# Patient Record
Sex: FEMALE | Race: WHITE | Employment: UNEMPLOYED | URBAN - METROPOLITAN AREA
[De-identification: names, ages, dates, MRNs, and addresses within clinical notes are randomized per-mention and may not be internally consistent; named-entity substitution may affect disease eponyms.]

---

## 2017-07-13 ENCOUNTER — OFFICE VISIT (OUTPATIENT)
Dept: SURGERY | Facility: CLINIC | Age: 27
End: 2017-07-13

## 2017-07-13 VITALS
WEIGHT: 142.81 LBS | DIASTOLIC BLOOD PRESSURE: 84 MMHG | RESPIRATION RATE: 16 BRPM | BODY MASS INDEX: 25.3 KG/M2 | TEMPERATURE: 98 F | HEART RATE: 74 BPM | HEIGHT: 63 IN | SYSTOLIC BLOOD PRESSURE: 118 MMHG

## 2017-07-13 DIAGNOSIS — K81.0 ACUTE CHOLECYSTITIS: Primary | ICD-10-CM

## 2017-07-13 DIAGNOSIS — K80.43 CHOLEDOCHOLITHIASIS WITH ACUTE CHOLECYSTITIS WITH OBSTRUCTION: ICD-10-CM

## 2017-07-13 DIAGNOSIS — Z98.890 STATUS POST ENDOSCOPIC RETROGRADE CHOLANGIOPANCREATOGRAPHY: ICD-10-CM

## 2017-07-13 DIAGNOSIS — K80.67 CALCULUS OF GALLBLADDER AND BILE DUCT WITH ACUTE ON CHRONIC CHOLECYSTITIS, WITH OBSTRUCTION: ICD-10-CM

## 2017-07-13 PROCEDURE — 99244 OFF/OP CNSLTJ NEW/EST MOD 40: CPT | Performed by: SURGERY

## 2017-07-14 ENCOUNTER — TELEPHONE (OUTPATIENT)
Dept: SURGERY | Facility: CLINIC | Age: 27
End: 2017-07-14

## 2017-07-14 DIAGNOSIS — K80.18 CALCULUS OF GALLBLADDER WITH OTHER CHOLECYSTITIS WITHOUT OBSTRUCTION: Primary | ICD-10-CM

## 2017-07-17 PROBLEM — K80.20 CHOLELITHIASIS: Status: ACTIVE | Noted: 2017-07-17

## 2017-07-17 PROBLEM — Z98.890 STATUS POST ENDOSCOPIC RETROGRADE CHOLANGIOPANCREATOGRAPHY: Status: ACTIVE | Noted: 2017-07-17

## 2017-07-17 PROBLEM — K81.0 ACUTE CHOLECYSTITIS: Status: ACTIVE | Noted: 2017-07-17

## 2017-07-17 PROBLEM — K80.43 CHOLEDOCHOLITHIASIS WITH ACUTE CHOLECYSTITIS WITH OBSTRUCTION: Status: ACTIVE | Noted: 2017-07-17

## 2017-07-17 NOTE — OR NURSING
Faxed request to [place] [address] record department for test results done while patient was hospitalized on July 2 thru 4th

## 2017-07-17 NOTE — H&P
New Patient  Jessika Lopez  1/31/1990 7/17/2017    This patient was referred by Dr Lamonte Pollard for evaluation and consultation for symptomatic cholelithiasis. Chief Complaint: cholelithiasis    History of Present Illness:  The patient is a 25-year-old fem N/A    Years of education: N/A  Number of children: N/A     Occupational History  None on file     Social History Main Topics   Smoking status: Not on file    Smokeless tobacco: Not on file    Alcohol use Not on file    Drug use: Unknown     Other Topics C The patient is a 64-year-old female who recently underwent admission at [facility] [address] [facility] [place] for cholelithiasis.   The patient states that she was admitted on July 6 through [address] emergency department for cholelithiasis and choled recuperation and postoperative course was also reviewed. All questions from the patient were answered in detail. The patient did verbalize understanding and does not have any further questions at this time.   The patient does wish to proceed with the prop

## 2017-07-28 ENCOUNTER — SURGERY (OUTPATIENT)
Age: 27
End: 2017-07-28

## 2017-07-28 ENCOUNTER — ANESTHESIA (OUTPATIENT)
Dept: SURGERY | Facility: HOSPITAL | Age: 27
DRG: 418 | End: 2017-07-28
Payer: COMMERCIAL

## 2017-07-28 ENCOUNTER — ANESTHESIA EVENT (OUTPATIENT)
Dept: SURGERY | Facility: HOSPITAL | Age: 27
DRG: 418 | End: 2017-07-28
Payer: COMMERCIAL

## 2017-07-28 ENCOUNTER — HOSPITAL ENCOUNTER (INPATIENT)
Facility: HOSPITAL | Age: 27
LOS: 2 days | Discharge: HOME | DRG: 418 | End: 2017-07-30
Attending: SURGERY | Admitting: SURGERY
Payer: COMMERCIAL

## 2017-07-28 ENCOUNTER — APPOINTMENT (OUTPATIENT)
Dept: GENERAL RADIOLOGY | Facility: HOSPITAL | Age: 27
DRG: 418 | End: 2017-07-28
Attending: SURGERY
Payer: COMMERCIAL

## 2017-07-28 DIAGNOSIS — K80.18 CALCULUS OF GALLBLADDER WITH OTHER CHOLECYSTITIS WITHOUT OBSTRUCTION: ICD-10-CM

## 2017-07-28 LAB
ALBUMIN SERPL-MCNC: 3.5 G/DL (ref 3.5–4.8)
ALP LIVER SERPL-CCNC: 54 U/L (ref 37–98)
ALT SERPL-CCNC: 20 U/L (ref 14–54)
AST SERPL-CCNC: 17 U/L (ref 15–41)
BASOPHILS # BLD AUTO: 0.03 X10(3) UL (ref 0–0.1)
BASOPHILS NFR BLD AUTO: 0.2 %
BILIRUB SERPL-MCNC: 0.3 MG/DL (ref 0.1–2)
BUN BLD-MCNC: 10 MG/DL (ref 8–20)
CALCIUM BLD-MCNC: 8.1 MG/DL (ref 8.3–10.3)
CHLORIDE: 108 MMOL/L (ref 101–111)
CO2: 24 MMOL/L (ref 22–32)
CREAT BLD-MCNC: 0.67 MG/DL (ref 0.55–1.02)
EOSINOPHIL # BLD AUTO: 0 X10(3) UL (ref 0–0.3)
EOSINOPHIL NFR BLD AUTO: 0 %
ERYTHROCYTE [DISTWIDTH] IN BLOOD BY AUTOMATED COUNT: 14.1 % (ref 11.5–16)
GLUCOSE BLD-MCNC: 118 MG/DL (ref 70–99)
HCT VFR BLD AUTO: 37.3 % (ref 34–50)
HGB BLD-MCNC: 12.4 G/DL (ref 12–16)
IMMATURE GRANULOCYTE COUNT: 0.08 X10(3) UL (ref 0–1)
IMMATURE GRANULOCYTE RATIO %: 0.5 %
LYMPHOCYTES # BLD AUTO: 0.52 X10(3) UL (ref 0.9–4)
LYMPHOCYTES NFR BLD AUTO: 3.5 %
M PROTEIN MFR SERPL ELPH: 6.9 G/DL (ref 6.1–8.3)
MCH RBC QN AUTO: 31.1 PG (ref 27–33.2)
MCHC RBC AUTO-ENTMCNC: 33.2 G/DL (ref 31–37)
MCV RBC AUTO: 93.5 FL (ref 81–100)
MONOCYTES # BLD AUTO: 0.21 X10(3) UL (ref 0.1–0.6)
MONOCYTES NFR BLD AUTO: 1.4 %
NEUTROPHIL ABS PRELIM: 13.9 X10 (3) UL (ref 1.3–6.7)
NEUTROPHILS # BLD AUTO: 13.9 X10(3) UL (ref 1.3–6.7)
NEUTROPHILS NFR BLD AUTO: 94.4 %
PLATELET # BLD AUTO: 245 10(3)UL (ref 150–450)
POCT LOT NUMBER: NORMAL
POCT URINE PREGNANCY: NEGATIVE
POTASSIUM SERPL-SCNC: 3.9 MMOL/L (ref 3.6–5.1)
RBC # BLD AUTO: 3.99 X10(6)UL (ref 3.8–5.1)
RED CELL DISTRIBUTION WIDTH-SD: 48.6 FL (ref 35.1–46.3)
SODIUM SERPL-SCNC: 141 MMOL/L (ref 136–144)
WBC # BLD AUTO: 14.7 X10(3) UL (ref 4–13)

## 2017-07-28 PROCEDURE — 0FT44ZZ RESECTION OF GALLBLADDER, PERCUTANEOUS ENDOSCOPIC APPROACH: ICD-10-PCS | Performed by: SURGERY

## 2017-07-28 PROCEDURE — 80053 COMPREHEN METABOLIC PANEL: CPT | Performed by: INTERNAL MEDICINE

## 2017-07-28 PROCEDURE — 76000 FLUOROSCOPY <1 HR PHYS/QHP: CPT | Performed by: SURGERY

## 2017-07-28 PROCEDURE — BF10YZZ FLUOROSCOPY OF BILE DUCTS USING OTHER CONTRAST: ICD-10-PCS | Performed by: SURGERY

## 2017-07-28 PROCEDURE — 85025 COMPLETE CBC W/AUTO DIFF WBC: CPT | Performed by: INTERNAL MEDICINE

## 2017-07-28 PROCEDURE — 81025 URINE PREGNANCY TEST: CPT | Performed by: SURGERY

## 2017-07-28 PROCEDURE — 88304 TISSUE EXAM BY PATHOLOGIST: CPT | Performed by: SURGERY

## 2017-07-28 RX ORDER — NALOXONE HYDROCHLORIDE 0.4 MG/ML
80 INJECTION, SOLUTION INTRAMUSCULAR; INTRAVENOUS; SUBCUTANEOUS AS NEEDED
Status: DISCONTINUED | OUTPATIENT
Start: 2017-07-28 | End: 2017-07-28 | Stop reason: HOSPADM

## 2017-07-28 RX ORDER — ONDANSETRON 2 MG/ML
4 INJECTION INTRAMUSCULAR; INTRAVENOUS AS NEEDED
Status: DISCONTINUED | OUTPATIENT
Start: 2017-07-28 | End: 2017-07-28 | Stop reason: HOSPADM

## 2017-07-28 RX ORDER — DEXAMETHASONE SODIUM PHOSPHATE 4 MG/ML
4 VIAL (ML) INJECTION AS NEEDED
Status: DISCONTINUED | OUTPATIENT
Start: 2017-07-28 | End: 2017-07-28 | Stop reason: HOSPADM

## 2017-07-28 RX ORDER — DEXTROSE, SODIUM CHLORIDE, AND POTASSIUM CHLORIDE 5; .45; .15 G/100ML; G/100ML; G/100ML
INJECTION INTRAVENOUS
Status: COMPLETED
Start: 2017-07-28 | End: 2017-07-28

## 2017-07-28 RX ORDER — HYDROMORPHONE HYDROCHLORIDE 1 MG/ML
INJECTION, SOLUTION INTRAMUSCULAR; INTRAVENOUS; SUBCUTANEOUS
Status: COMPLETED
Start: 2017-07-28 | End: 2017-07-28

## 2017-07-28 RX ORDER — DEXTROSE, SODIUM CHLORIDE, AND POTASSIUM CHLORIDE 5; .45; .15 G/100ML; G/100ML; G/100ML
INJECTION INTRAVENOUS CONTINUOUS
Status: DISCONTINUED | OUTPATIENT
Start: 2017-07-28 | End: 2017-07-30

## 2017-07-28 RX ORDER — HYDROCODONE BITARTRATE AND ACETAMINOPHEN 5; 325 MG/1; MG/1
2 TABLET ORAL AS NEEDED
Status: DISCONTINUED | OUTPATIENT
Start: 2017-07-28 | End: 2017-07-28 | Stop reason: HOSPADM

## 2017-07-28 RX ORDER — FAMOTIDINE 10 MG/ML
20 INJECTION, SOLUTION INTRAVENOUS 2 TIMES DAILY
Status: DISCONTINUED | OUTPATIENT
Start: 2017-07-28 | End: 2017-07-30

## 2017-07-28 RX ORDER — HEPARIN SODIUM 5000 [USP'U]/ML
5000 INJECTION, SOLUTION INTRAVENOUS; SUBCUTANEOUS EVERY 8 HOURS
Status: DISCONTINUED | OUTPATIENT
Start: 2017-07-28 | End: 2017-07-30

## 2017-07-28 RX ORDER — HYDROMORPHONE HYDROCHLORIDE 1 MG/ML
0.4 INJECTION, SOLUTION INTRAMUSCULAR; INTRAVENOUS; SUBCUTANEOUS EVERY 5 MIN PRN
Status: DISCONTINUED | OUTPATIENT
Start: 2017-07-28 | End: 2017-07-28 | Stop reason: HOSPADM

## 2017-07-28 RX ORDER — FAMOTIDINE 20 MG/1
20 TABLET ORAL 2 TIMES DAILY
Status: DISCONTINUED | OUTPATIENT
Start: 2017-07-28 | End: 2017-07-30

## 2017-07-28 RX ORDER — HEPARIN SODIUM 5000 [USP'U]/ML
INJECTION, SOLUTION INTRAVENOUS; SUBCUTANEOUS
Status: COMPLETED
Start: 2017-07-28 | End: 2017-07-28

## 2017-07-28 RX ORDER — HYDROCODONE BITARTRATE AND ACETAMINOPHEN 5; 325 MG/1; MG/1
1 TABLET ORAL AS NEEDED
Status: DISCONTINUED | OUTPATIENT
Start: 2017-07-28 | End: 2017-07-28 | Stop reason: HOSPADM

## 2017-07-28 RX ORDER — SODIUM CHLORIDE, SODIUM LACTATE, POTASSIUM CHLORIDE, CALCIUM CHLORIDE 600; 310; 30; 20 MG/100ML; MG/100ML; MG/100ML; MG/100ML
INJECTION, SOLUTION INTRAVENOUS CONTINUOUS
Status: DISCONTINUED | OUTPATIENT
Start: 2017-07-28 | End: 2017-07-30

## 2017-07-28 RX ORDER — HYDROMORPHONE HYDROCHLORIDE 1 MG/ML
0.5 INJECTION, SOLUTION INTRAMUSCULAR; INTRAVENOUS; SUBCUTANEOUS EVERY 30 MIN PRN
Status: DISCONTINUED | OUTPATIENT
Start: 2017-07-28 | End: 2017-07-30

## 2017-07-28 RX ORDER — METOCLOPRAMIDE HYDROCHLORIDE 5 MG/ML
10 INJECTION INTRAMUSCULAR; INTRAVENOUS AS NEEDED
Status: DISCONTINUED | OUTPATIENT
Start: 2017-07-28 | End: 2017-07-28 | Stop reason: HOSPADM

## 2017-07-28 RX ORDER — HEPARIN SODIUM 5000 [USP'U]/ML
5000 INJECTION, SOLUTION INTRAVENOUS; SUBCUTANEOUS ONCE
Status: COMPLETED | OUTPATIENT
Start: 2017-07-28 | End: 2017-07-28

## 2017-07-28 RX ORDER — SODIUM CHLORIDE, SODIUM LACTATE, POTASSIUM CHLORIDE, CALCIUM CHLORIDE 600; 310; 30; 20 MG/100ML; MG/100ML; MG/100ML; MG/100ML
INJECTION, SOLUTION INTRAVENOUS CONTINUOUS
Status: DISCONTINUED | OUTPATIENT
Start: 2017-07-28 | End: 2017-07-28

## 2017-07-28 RX ORDER — ONDANSETRON 2 MG/ML
4 INJECTION INTRAMUSCULAR; INTRAVENOUS EVERY 6 HOURS PRN
Status: DISCONTINUED | OUTPATIENT
Start: 2017-07-28 | End: 2017-07-30

## 2017-07-28 RX ORDER — BUPIVACAINE HYDROCHLORIDE AND EPINEPHRINE 5; 5 MG/ML; UG/ML
INJECTION, SOLUTION EPIDURAL; INTRACAUDAL; PERINEURAL AS NEEDED
Status: DISCONTINUED | OUTPATIENT
Start: 2017-07-28 | End: 2017-07-28 | Stop reason: HOSPADM

## 2017-07-28 NOTE — ANESTHESIA PREPROCEDURE EVALUATION
PRE-OP EVALUATION    Patient Name: Dallas Pastures    Pre-op Diagnosis: Calculus of gallbladder with other cholecystitis without obstruction [K80.18]    Procedure(s):  LAPAROSCOPIC CHOLECYSTECTOMY WITH CHOLANGIOGRAM    Surgeon(s) and Role:     * [facility]    No notable dental history. Pulmonary    Pulmonary exam normal.                 Other findings            ASA: 1   Plan: general  NPO status verified and patient meets guidelines.     Post-procedure pain management plan discussed with mihai

## 2017-07-28 NOTE — H&P (VIEW-ONLY)
New Patient  Tanna Jeans  1/31/1990 7/17/2017    This patient was referred by Dr Zakia Schmid for evaluation and consultation for symptomatic cholelithiasis. Chief Complaint: cholelithiasis    History of Present Illness:  The patient is a 64-year-old fem N/A    Years of education: N/A  Number of children: N/A     Occupational History  None on file     Social History Main Topics   Smoking status: Not on file    Smokeless tobacco: Not on file    Alcohol use Not on file    Drug use: Unknown     Other Topics C The patient is a 38-year-old female who recently underwent admission at [facility] [address] [facility] [place] for cholelithiasis.   The patient states that she was admitted on July 6 through [address] emergency department for cholelithiasis and choled recuperation and postoperative course was also reviewed. All questions from the patient were answered in detail. The patient did verbalize understanding and does not have any further questions at this time.   The patient does wish to proceed with the prop

## 2017-07-28 NOTE — OPERATIVE REPORT
[facility]   Operative Note    Maypearl Pastures Location: OR   CSN [id number] MRN [id number]   Admission Date 7/28/2017 Operation Date 7/28/2017   Attending Physician Roxanne José MD Operating Physician Jennifer Briseno MD     Date of procedure:   July pain management for 48 hours. Currently, the patient is asymptomatic and the ampullary stent remains in place. Andrew Emanuel is here today to discuss surgical treatment options.   She has had a prior laparoscopic appendectomy and .     Andrew Emanuel will require perioperative risks were discussed and the patient does not have any questions and does wish to proceed with surgery today.     Note:  A surgical assistant was essential to the performance and conduct of this case, especially in the performance of the chola filling defect which appeared to be 2 separate common bile duct stones near the mid common bile duct at the confluence of the cystic duct and the common hepatic duct. There was flow of contrast into the duodenum.   Representative images were submitted to t

## 2017-07-28 NOTE — ANESTHESIA POSTPROCEDURE EVALUATION
[address] [facility] Patient Status:  Hospital Outpatient Surgery   Age/Gender 32year old female MRN [id number]   [facility] SURGERY Attending Peterson Danielson MD   Hosp Day # 0 PCP Lucrecia Clark MD       Anesthesia Post-op

## 2017-07-28 NOTE — PLAN OF CARE
Patient AOX4. Lap sites intact. POC discussed with patient all questions and concerns addressed. RN paged Dr. Padmini Salcedo, patient understands NPO status, will ask Dr. Padmini Salcedo regarding clear liquids.

## 2017-07-28 NOTE — INTERVAL H&P NOTE
Pre-op Diagnosis: Calculus of gallbladder with other cholecystitis without obstruction [K80.18]    The above referenced H&P was reviewed by Hillary Ramsay MD on 7/28/2017, the patient was examined and no significant changes have occurred in the patient's

## 2017-07-28 NOTE — CONSULTS
[facility]                       Gastroenterology Consultation-[facility]    Theora Bad Patient Status:  Outpatient in a Bed    1990 MRN [id number]   [facility] 3NW-A Attending Josie Baumann MD 7-8 cigarettes per day; The patient drinks alcohol on social occasions; The patient has no history of IV drug use or other illicit substances.   FamHx: grandmother with colon cancer  ROS:  In addition to the pertinent positives described above: ascites is clinically apparent; no tympany to percussion. Incisions intact.    Ext: No peripheral edema or cyanosis  Skin: Warm and dry  Psychiatric: Appropriate mood and congruent affect without obvious depression or anxiety  Labs:    No results for input(

## 2017-07-28 NOTE — BRIEF OP NOTE
Pre-Operative Diagnosis: cholelithiasis,gall stone pancreatitis, common bile duct stone status post ERCP and stent placement     Post-Operative Diagnosis:  hydrops of the gallbladder, common bile duct stone, positive cholangiogram, chronic cholecystitis

## 2017-07-29 ENCOUNTER — ANESTHESIA (OUTPATIENT)
Dept: ENDOSCOPY | Facility: HOSPITAL | Age: 27
DRG: 418 | End: 2017-07-29
Payer: COMMERCIAL

## 2017-07-29 ENCOUNTER — APPOINTMENT (OUTPATIENT)
Dept: GENERAL RADIOLOGY | Facility: HOSPITAL | Age: 27
DRG: 418 | End: 2017-07-29
Attending: INTERNAL MEDICINE
Payer: COMMERCIAL

## 2017-07-29 ENCOUNTER — ANESTHESIA EVENT (OUTPATIENT)
Dept: ENDOSCOPY | Facility: HOSPITAL | Age: 27
DRG: 418 | End: 2017-07-29
Payer: COMMERCIAL

## 2017-07-29 ENCOUNTER — SURGERY (OUTPATIENT)
Age: 27
End: 2017-07-29

## 2017-07-29 PROCEDURE — BF101ZZ FLUOROSCOPY OF BILE DUCTS USING LOW OSMOLAR CONTRAST: ICD-10-PCS | Performed by: INTERNAL MEDICINE

## 2017-07-29 PROCEDURE — S0028 INJECTION, FAMOTIDINE, 20 MG: HCPCS | Performed by: PHYSICIAN ASSISTANT

## 2017-07-29 PROCEDURE — 74328 X-RAY BILE DUCT ENDOSCOPY: CPT | Performed by: INTERNAL MEDICINE

## 2017-07-29 PROCEDURE — 0FPB8DZ REMOVAL OF INTRALUMINAL DEVICE FROM HEPATOBILIARY DUCT, VIA NATURAL OR ARTIFICIAL OPENING ENDOSCOPIC: ICD-10-PCS | Performed by: INTERNAL MEDICINE

## 2017-07-29 RX ORDER — HYDROMORPHONE HYDROCHLORIDE 1 MG/ML
0.4 INJECTION, SOLUTION INTRAMUSCULAR; INTRAVENOUS; SUBCUTANEOUS EVERY 5 MIN PRN
Status: DISCONTINUED | OUTPATIENT
Start: 2017-07-29 | End: 2017-07-29 | Stop reason: HOSPADM

## 2017-07-29 RX ORDER — METOCLOPRAMIDE HYDROCHLORIDE 5 MG/ML
10 INJECTION INTRAMUSCULAR; INTRAVENOUS AS NEEDED
Status: DISCONTINUED | OUTPATIENT
Start: 2017-07-29 | End: 2017-07-29 | Stop reason: HOSPADM

## 2017-07-29 RX ORDER — SODIUM CHLORIDE, SODIUM LACTATE, POTASSIUM CHLORIDE, CALCIUM CHLORIDE 600; 310; 30; 20 MG/100ML; MG/100ML; MG/100ML; MG/100ML
INJECTION, SOLUTION INTRAVENOUS CONTINUOUS
Status: DISCONTINUED | OUTPATIENT
Start: 2017-07-29 | End: 2017-07-30

## 2017-07-29 RX ORDER — ONDANSETRON 2 MG/ML
4 INJECTION INTRAMUSCULAR; INTRAVENOUS AS NEEDED
Status: DISCONTINUED | OUTPATIENT
Start: 2017-07-29 | End: 2017-07-29 | Stop reason: HOSPADM

## 2017-07-29 RX ORDER — NALOXONE HYDROCHLORIDE 0.4 MG/ML
80 INJECTION, SOLUTION INTRAMUSCULAR; INTRAVENOUS; SUBCUTANEOUS AS NEEDED
Status: DISCONTINUED | OUTPATIENT
Start: 2017-07-29 | End: 2017-07-29 | Stop reason: HOSPADM

## 2017-07-29 NOTE — PROGRESS NOTES
PAGED DR. HOGUE TO INQUIRE ABOUT PATIENT HAVING ERCP TONIGHT-PATIENT SCHEDULED FOR 1730 WITH DR. GRIMALDO.

## 2017-07-29 NOTE — PROGRESS NOTES
[facility] Pharmacy Note:  Renal Adjustment for Unasyn     Tristan Massey is a 32year old female who has been prescribed Unasyn 3 gm every 6 hrs. CrCl is estimated creatinine clearance is 102.5 mL/min (based on SCr of 0.67 mg/dL).  so the dose has been adjusted t

## 2017-07-29 NOTE — OPERATIVE REPORT
Holly Castillo Patient Status:  Inpatient    1990 MRN [id number]   [facility] ENDOSCOPY Attending Sammie Brown MD   Hosp Day # 1 PCP Lisa Waite MD     PREOPERATIVE DIAGNOSIS/INDICATION: Abnormal IOC, post cholecystectomy, 0.035 [employer]  Triple lumen balloon catheter, deep cannulation was performed with the help of a 0.035 straight Hydra jagwire 260cm in length.  Biliary balloon stone extraction was performed the help of [employer]’s 9-12mm triple lumen stone

## 2017-07-29 NOTE — PROGRESS NOTES
PATIENT HAS IV FLUIDS INFUSING, ON ROOM AIR, IV UNASYN SCHEDULED, VOIDING WELL, AMBULATES INDEPENDENTLY, INCISIONS ARE C/D/I, DILAUDID PRN PAIN, NPO FOR ERCP THIS EVENING. WILL CONTINUE TO MONITOR.

## 2017-07-29 NOTE — ANESTHESIA POSTPROCEDURE EVALUATION
[address] [place] [address] Patient Status:  Inpatient   Age/Gender 32year old female MRN [id number]   Location [facility]. Attending Ashwin Garner, [address] [facility] Day # 1 PCP Raymundo Hinson MD       Anesthesia Post-op Note    Procedur

## 2017-07-29 NOTE — PROGRESS NOTES
[facility]  Progress Note    Juan Burks Patient Status:  Inpatient    1990 MRN [id number]   [facility] 3NW-A Attending Elly Silva MD   Hosp Day # 1 PCP Tushar Graham MD     Subjective:  Patient status post laparosco

## 2017-07-29 NOTE — PROGRESS NOTES
PATIENT LEFT FLOOR PER CART AND WAS TRANSPORTED TO ENDOSCOPY FOR ERCP IN STABLE CONDITION. CONSENT SIGNED, PRE-OP CHECKLIST COMPLETED, IV UNASYN INFUSING.

## 2017-07-29 NOTE — OR NURSING
Phone report given to [facility]. Pt. Comfortable without complaints. Warm compress to left hand.

## 2017-07-29 NOTE — ANESTHESIA PREPROCEDURE EVALUATION
PRE-OP EVALUATION    Patient Name: Luis Resendiz    Pre-op Diagnosis: Calculus of gallbladder with other cholecystitis without obstruction [K80.18]    Procedure(s):  ENDOSCOPIC RETROGRADE CHOLANGIOPANCREATOGRAPHY (ERCP) with anesthesia    Surgeon(s) and Ro Packs/day  For 13.00 Years     Smokeless tobacco: Never Used    Alcohol use Yes    Comment: rare       Drug use: No     Available pre-op labs reviewed.     Lab Results  Component Value Date   WBC 14.7 (H) 07/28/2017   RBC 3.99 07/28/2017   HGB 12.4 07/28/20

## 2017-07-30 VITALS
TEMPERATURE: 98 F | SYSTOLIC BLOOD PRESSURE: 97 MMHG | OXYGEN SATURATION: 98 % | HEIGHT: 63 IN | WEIGHT: 147 LBS | DIASTOLIC BLOOD PRESSURE: 73 MMHG | BODY MASS INDEX: 26.05 KG/M2 | HEART RATE: 63 BPM | RESPIRATION RATE: 16 BRPM

## 2017-07-30 RX ORDER — HYDROCODONE BITARTRATE AND ACETAMINOPHEN 5; 325 MG/1; MG/1
1-2 TABLET ORAL EVERY 6 HOURS PRN
Qty: 30 TABLET | Refills: 0 | Status: SHIPPED | OUTPATIENT
Start: 2017-07-30 | End: 2017-08-10

## 2017-07-30 RX ORDER — HYDROCODONE BITARTRATE AND ACETAMINOPHEN 5; 325 MG/1; MG/1
2 TABLET ORAL EVERY 4 HOURS PRN
Status: DISCONTINUED | OUTPATIENT
Start: 2017-07-30 | End: 2017-07-30

## 2017-07-30 RX ORDER — HYDROCODONE BITARTRATE AND ACETAMINOPHEN 5; 325 MG/1; MG/1
1 TABLET ORAL EVERY 4 HOURS PRN
Status: DISCONTINUED | OUTPATIENT
Start: 2017-07-30 | End: 2017-07-30

## 2017-07-30 NOTE — DISCHARGE SUMMARY
[facility]  Discharge Summary    Juan Burks Patient Status:  Inpatient    1990 MRN [id number]   [facility] 3NW-A Attending No att. providers found   Hosp Day # 2 PCP Tushar Graham MD     Date of Admission: 2017    Da placement. The patient remained admitted for 5 days. On July 2, the patient was readmitted for abdominal pain to [place].  There was no evidence of pancreatitis. The patient remained admitted for pain management for 48 hours.   Currently, th issues/questions      Harini Kincaid  7/30/2017  6:11 PM

## 2017-07-30 NOTE — PROGRESS NOTES
Gastroenterology Progress Note  Patient Name: Lexi Stock  Chief Complaint: choledocholithiasis  S: Pt denies abdominal pain after ERCP. She denies vomiting. She tolerated CLD.    O: BP 96/47 (BP Location: Right arm)   Pulse 77   Temp 98.2 °F (36.8 °C) (O

## 2017-07-30 NOTE — PROGRESS NOTES
VERBALLY SAID PATIENT COULD BE DISCHARGED ONCE TOLERATING [address]: [address] [facility] DEAN TO REQUEST DISCHARGE ORDER W/ MED REC  [address] IN SURGERY AND [address] PLACE ORDERS AFTER.

## 2017-07-30 NOTE — DISCHARGE SUMMARY
[facility]  Progress Note    Tan Purdy Patient Status:  Inpatient    1990 MRN [id number]   [facility] 3NW-A Attending No att. providers found   Hosp Day # 2 PCP Jasmine Juarez MD     Subjective:  Patient is doing well sta

## 2017-07-30 NOTE — PROGRESS NOTES
NURSING DISCHARGE NOTE    Discharged Home via Ambulatory. Accompanied by Family member  Belongings Taken by patient/family. PATIENT DISCHARGED HOME IN STABLE CONDITION.  PATIENT LEFT FLOOR AMBULATORY (REFUSED WHEELCHAIR) AND WAS ACCOMPANIED BY FAMIL

## 2017-08-09 NOTE — PAYOR COMM NOTE
--------------  ADMISSION REVIEW     Payor: Mekhi Palacios #:  [id number]  Authorization Number: N/A    Admit date: 7/28/17  Admit time: 2219       Admitting Physician: Cris Machado MD    Primary Care Physician: Jailene Burgos MD    Date of patient remained admitted for pain management for 48 hours. Currently, the patient is asymptomatic and the ampullary stent remains in place. Lily Weeks is here today to discuss surgical treatment options.   She has had a prior laparoscopic appendectomy and C-s potential intraoperative and perioperative risks were discussed and the patient does not have any questions and does wish to proceed with surgery today.     Note:  A surgical assistant was essential to the performance and conduct of this case, especially i performed. There was evidence of a filling defect which appeared to be 2 separate common bile duct stones near the mid common bile duct at the confluence of the cystic duct and the common hepatic duct. There was flow of contrast into the duodenum.   Repres contents     Merna Ulloa MD  2017    Operative Report  Date of Service: 2017 6:11 PM  Rebekah Larios MD   Gastroenterology                 Akbar Andrews Patient Status:  Inpatient    1990 MRN [id number]   [facility] c/w prior cholecystectomy.   THERAPEUTICS: The old CBD stent was removed through the scopce with the help of a snare, the CBD was cannulated using a standard 0.035 [employer]  Triple lumen balloon catheter, deep cannulation was performed with the hel  On July 8, she underwent a laparoscopy by Dr Luciana Green. Connor Gifford intention was to perform a laparoscopic cholecystectomy however the patient was told that because of the severe inflammatory changes, the surgery could not be completed and a Joey-Shaw drain w

## 2017-08-10 ENCOUNTER — OFFICE VISIT (OUTPATIENT)
Dept: SURGERY | Facility: CLINIC | Age: 27
End: 2017-08-10

## 2017-08-10 VITALS
BODY MASS INDEX: 25.34 KG/M2 | HEIGHT: 63 IN | WEIGHT: 143 LBS | DIASTOLIC BLOOD PRESSURE: 78 MMHG | SYSTOLIC BLOOD PRESSURE: 121 MMHG | HEART RATE: 90 BPM | TEMPERATURE: 99 F | RESPIRATION RATE: 18 BRPM

## 2017-08-10 DIAGNOSIS — K80.18 CALCULUS OF GALLBLADDER WITH OTHER CHOLECYSTITIS WITHOUT OBSTRUCTION: ICD-10-CM

## 2017-08-10 DIAGNOSIS — K80.43 CHOLEDOCHOLITHIASIS WITH ACUTE CHOLECYSTITIS WITH OBSTRUCTION: ICD-10-CM

## 2017-08-10 DIAGNOSIS — K81.0 ACUTE CHOLECYSTITIS: Primary | ICD-10-CM

## 2017-08-10 DIAGNOSIS — K80.67 CALCULUS OF GALLBLADDER AND BILE DUCT WITH ACUTE ON CHRONIC CHOLECYSTITIS, WITH OBSTRUCTION: ICD-10-CM

## 2017-08-10 DIAGNOSIS — Z98.890 STATUS POST ENDOSCOPIC RETROGRADE CHOLANGIOPANCREATOGRAPHY: ICD-10-CM

## 2017-08-10 NOTE — PROGRESS NOTES
Post Operative Visit Note       Active Problems  1. Acute cholecystitis    2. Calculus of gallbladder with other cholecystitis without obstruction    3. Choledocholithiasis with acute cholecystitis with obstruction    4.  Status post endoscopic retrograde c Smokeless tobacco: Never Used                        Alcohol use: Yes                Comment: rare    Drug use: No                 No current outpatient prescriptions on file.       Review of Systems  The Review of Systems has been reviewed by me during tod nondistended, good bowel sounds. Incisions: All laparoscopic incisions are clean, dry, intact, without erythema, or cellulitis. Neurological: She is alert and oriented to person, place, and time. Skin: Skin is warm and dry. She is not diaphoretic.

## (undated) DEVICE — FILTERLINE NASAL ADULT O2/CO2

## (undated) DEVICE — SOL  .9 1000ML BTL

## (undated) DEVICE — HYDRA JAGWIRE

## (undated) DEVICE — SYRINGE 10ML LL TIP

## (undated) DEVICE — 3M™ RED DOT™ MONITORING ELECTRODE WITH FOAM TAPE AND STICKY GEL, 50/BAG, 20/CASE, 72/PLT 2570: Brand: RED DOT™

## (undated) DEVICE — SYRINGE 50ML LL TIP

## (undated) DEVICE — KENDALL SCD EXPRESS SLEEVES, KNEE LENGTH, MEDIUM: Brand: KENDALL SCD

## (undated) DEVICE — SNARE CAPTIFLEX MICRO-OVL OLY

## (undated) DEVICE — CHLORAPREP 26ML APPLICATOR

## (undated) DEVICE — ZIPWIRE GUIDEWIRE .035X150 STR

## (undated) DEVICE — ENDOSCOPY PACK UPPER: Brand: MEDLINE INDUSTRIES, INC.

## (undated) DEVICE — ENDOPATH XCEL UNIVERSAL TROCAR STABLILITY SLEEVES: Brand: ENDOPATH XCEL

## (undated) DEVICE — LOCKING DEVICE RX & BIOPSY CAP

## (undated) DEVICE — SUTURE VICRYL 0 UR-6

## (undated) DEVICE — RETRIEVAL BALLOON CATHETER: Brand: EXTRACTOR™ PRO RX

## (undated) DEVICE — SYRINGE 30ML LL TIP

## (undated) DEVICE — OPEN-END FLEXI-TIP URETERAL CATHETER: Brand: FLEXI-TIP

## (undated) DEVICE — LAP CHOLE/APPY CDS-LF: Brand: MEDLINE INDUSTRIES, INC.

## (undated) DEVICE — BANDAID COVERLET 1X3

## (undated) DEVICE — SUTURE VICRYL 0

## (undated) DEVICE — ENDOPATH XCEL WITH OPTIVIEW TECHNOLOGY BLADELESS TROCARS WITH STABILITY SLEEVES: Brand: ENDOPATH XCEL OPTIVIEW

## (undated) DEVICE — ENDOPATH XCEL DILATING TIP TROCARS WITH STABILITY SLEEVES: Brand: ENDOPATH XCEL

## (undated) DEVICE — Device: Brand: DEFENDO AIR/WATER/SUCTION AND BIOPSY VALVE

## (undated) DEVICE — BOWLS UTILITY 16OZ

## (undated) DEVICE — LIGAMAX 5 MM ENDOSCOPIC MULTIPLE CLIP APPLIER: Brand: LIGAMAX

## (undated) DEVICE — SUTURE VICRYL 5-0 RB-1

## (undated) DEVICE — GLOVE BIOGEL M SURG SZ 6-1/2

## (undated) DEVICE — 1200CC GUARDIAN II: Brand: GUARDIAN

## (undated) NOTE — LETTER
[facility]  Keon Banda 59 [address] [facility], [address] [place] [address]    Consent for Operation    Date: __________________    Time: _______________    1.  I authorize the performance upon Lizbeth Sofia the following operation:    Procedure(s):  ENDOSCOPIC RETROGRADE procedure has been videotaped, the surgeon will obtain the original videotape. The hospital will not be responsible for storage or maintenance of this tape.     6. For the purpose of advancing medical education, I consent to the admittance of observers to t STATEMENTS REQUIRING INSERTION OR COMPLETION WERE FILLED IN.     Signature of Patient:   ___________________________    When the patient is a minor or mentally incompetent to give consent:  Signature of person authorized to consent for patient: ____________ drugs/illegal medications). Failure to inform my anesthesiologist about these medicines may increase my risk of anesthetic complications. · If I am allergic to anything or have had a reaction to anesthesia before.     3. I understand how the anesthesia med I have discussed the procedure and information above with the patient (or patient’s representative) and answered their questions. The patient or their representative has agreed to have anesthesia services.     _______________________________________________

## (undated) NOTE — LETTER
[facility]  Keon Banda 59 [address] [facility], [address] [place] [address]    Consent for Operation    Date: __________________    Time: _______________    1.  I authorize the performance upon James Aly the following operation:    Procedure(s):  Endoscopic Retrograde procedure has been videotaped, the surgeon will obtain the original videotape. The hospital will not be responsible for storage or maintenance of this tape.     6. For the purpose of advancing medical education, I consent to the admittance of observers to t STATEMENTS REQUIRING INSERTION OR COMPLETION WERE FILLED IN.     Signature of Patient:   ___________________________    When the patient is a minor or mentally incompetent to give consent:  Signature of person authorized to consent for patient: ____________ drugs/illegal medications). Failure to inform my anesthesiologist about these medicines may increase my risk of anesthetic complications. · If I am allergic to anything or have had a reaction to anesthesia before.     3. I understand how the anesthesia med I have discussed the procedure and information above with the patient (or patient’s representative) and answered their questions. The patient or their representative has agreed to have anesthesia services.     _______________________________________________

## (undated) NOTE — LETTER
[facility] Testing Department  Phone: [phone]  OUTSIDE TESTING RESULT REQUEST      TO:  [facility] Today's Date: 7/17/17    FAX #: [phone]     IMPORTANT: FOR YOUR IMMEDIATE ATTENTION  Please FAX all test res